# Patient Record
Sex: MALE
[De-identification: names, ages, dates, MRNs, and addresses within clinical notes are randomized per-mention and may not be internally consistent; named-entity substitution may affect disease eponyms.]

---

## 2019-03-04 ENCOUNTER — HOSPITAL ENCOUNTER (EMERGENCY)
Dept: HOSPITAL 31 - C.ER | Age: 64
LOS: 1 days | Discharge: HOME | End: 2019-03-05
Payer: SELF-PAY

## 2019-03-04 DIAGNOSIS — F43.23: Primary | ICD-10-CM

## 2019-03-04 LAB
ALBUMIN SERPL-MCNC: 4.7 G/DL (ref 3.5–5)
ALBUMIN/GLOB SERPL: 1.9 {RATIO} (ref 1–2.1)
ALT SERPL-CCNC: 40 U/L (ref 21–72)
AST SERPL-CCNC: 21 U/L (ref 17–59)
BASOPHILS # BLD AUTO: 0 K/UL (ref 0–0.2)
BASOPHILS NFR BLD: 0.3 % (ref 0–2)
BILIRUB UR-MCNC: NEGATIVE MG/DL
BUN SERPL-MCNC: 18 MG/DL (ref 9–20)
CALCIUM SERPL-MCNC: 9.1 MG/DL (ref 8.6–10.4)
COLOR UR: YELLOW
EOSINOPHIL # BLD AUTO: 0.1 K/UL (ref 0–0.7)
EOSINOPHIL NFR BLD: 2.4 % (ref 0–4)
ERYTHROCYTE [DISTWIDTH] IN BLOOD BY AUTOMATED COUNT: 13.5 % (ref 11.5–14.5)
GFR NON-AFRICAN AMERICAN: > 60
GLUCOSE UR STRIP-MCNC: NORMAL MG/DL
HGB BLD-MCNC: 15.1 G/DL (ref 12–18)
LEUKOCYTE ESTERASE UR-ACNC: (no result) LEU/UL
LYMPHOCYTES # BLD AUTO: 1.1 K/UL (ref 1–4.3)
LYMPHOCYTES NFR BLD AUTO: 20.9 % (ref 20–40)
MCH RBC QN AUTO: 28.4 PG (ref 27–31)
MCHC RBC AUTO-ENTMCNC: 33.2 G/DL (ref 33–37)
MCV RBC AUTO: 85.5 FL (ref 80–94)
MONOCYTES # BLD: 0.4 K/UL (ref 0–0.8)
MONOCYTES NFR BLD: 6.5 % (ref 0–10)
NEUTROPHILS # BLD: 3.8 K/UL (ref 1.8–7)
NEUTROPHILS NFR BLD AUTO: 69.9 % (ref 50–75)
NRBC BLD AUTO-RTO: 0.1 % (ref 0–2)
PH UR STRIP: 6 [PH] (ref 5–8)
PLATELET # BLD: 165 K/UL (ref 130–400)
PMV BLD AUTO: 9.5 FL (ref 7.2–11.7)
PROT UR STRIP-MCNC: NEGATIVE MG/DL
RBC # BLD AUTO: 5.34 MIL/UL (ref 4.4–5.9)
RBC # UR STRIP: NEGATIVE /UL
SP GR UR STRIP: 1.02 (ref 1–1.03)
UROBILINOGEN UR-MCNC: NORMAL MG/DL (ref 0.2–1)
WBC # BLD AUTO: 5.4 K/UL (ref 4.8–10.8)

## 2019-03-04 PROCEDURE — 85025 COMPLETE CBC W/AUTO DIFF WBC: CPT

## 2019-03-04 PROCEDURE — 81001 URINALYSIS AUTO W/SCOPE: CPT

## 2019-03-04 PROCEDURE — 99284 EMERGENCY DEPT VISIT MOD MDM: CPT

## 2019-03-04 PROCEDURE — 84100 ASSAY OF PHOSPHORUS: CPT

## 2019-03-04 PROCEDURE — 84443 ASSAY THYROID STIM HORMONE: CPT

## 2019-03-04 PROCEDURE — 82948 REAGENT STRIP/BLOOD GLUCOSE: CPT

## 2019-03-04 PROCEDURE — 93005 ELECTROCARDIOGRAM TRACING: CPT

## 2019-03-04 PROCEDURE — 80053 COMPREHEN METABOLIC PANEL: CPT

## 2019-03-04 PROCEDURE — 83735 ASSAY OF MAGNESIUM: CPT

## 2019-03-04 NOTE — C.PDOC
History Of Present Illness





63 y/o male with no known medical hx (has not seen a physician in many years) 

biba for feeling anxious and tense, with frequent urination and dry mouth. pt 

reports he has had these symptoms constantly for 2-3 months since [place] 

[place] from [place] after having a dispute with his wife. pt sts he was 

seen at another hospital 20 days ago ([facility]?)for same symptoms, given a pill 

to 'calm him' and referred to psychiatry, but he hasn't followed up. denies hi, 

si and ah.  denies abdominal pain, chest pain, sob, fever, chills, nausea, 

dysuria and vomiting.  no headaches. pt sts he just couldn't take the symptoms 

today, so pulled over his car ([employer] [employer]) and called ambulance. 


Time Seen by Provider: 19 19:06


Chief Complaint (Nursing): Psychiatric Evaluation


History/Exam Limitations: [id number]


Onset/Duration Of Symptoms: Days (2-3 months)


Current Symptoms Are (Timing): Worse


Suicide/Self Injury Attempted (Context): None


Modifying Factor(s): None


Severity: Moderate


Associated Symptoms: Anxiety.  denies: Suicidal Thoughts, Suicidal Plan





Past Medical History


Reviewed: Historical Data, Nursing Documentation, Vital Signs


Vital Signs: 





                                Last Vital Signs











Temp  98.8 F   19 18:49


 


Pulse  67   19 18:49


 


Resp  20   19 18:49


 


BP  162/62 H  19 18:49


 


Pulse Ox  99   19 18:49














- Medical History


PMH: No Chronic Diseases


Surgical History: No Surg Hx


Family History: States: Unknown Family Hx





- Social History


Hx Tobacco Use: No


Hx Alcohol Use: No


Hx Substance Use: No





- Immunization History


Hx Tetanus Toxoid Vaccination: Yes


Hx Influenza Vaccination: No


Hx Pneumococcal Vaccination: No





Review Of Systems


Constitutional: Negative for: Fever, Chills


ENT: Negative for: Throat Pain


Cardiovascular: Negative for: Chest Pain, Palpitations


Respiratory: Negative for: Cough, Shortness of Breath


Gastrointestinal: Negative for: Nausea, Vomiting, Abdominal Pain


Genitourinary: Positive for: Frequency.  Negative for: Dysuria


Skin: Negative for: Rash


Neurological: Negative for: Weakness, Numbness


Psych: Positive for: Anxiety





Physical Exam





- Physical Exam


Appears: Non-toxic, No Acute Distress, Other (anxious)


Skin: Normal Color, Warm, Dry


Head: Atraumatic, Normacephalic


Eye(s): bilateral: Normal Inspection


Oral Mucosa: Moist


Neck: Normal ROM, Supple


Chest: Symmetrical


Cardiovascular: Rhythm Regular


Respiratory: Normal Breath Sounds, No Rales, No Rhonchi, No Wheezing


Gastrointestinal/Abdominal: Soft, No Tenderness, No Guarding, No Rebound


Extremity: Normal ROM, No Tenderness, No Swelling


Neurological/Psych: Oriented x3, Normal Speech, Normal Cognition


Gait: Steady





ED Course And Treatment





- Laboratory Results


Result Diagrams: 


                                 19 19:49





                                 19 19:49


ECG: Interpreted By Me, Viewed By Me


ECG Rhythm: Sinus Bradycardia


ECG Interpretation: Normal


Interpretation Of ECG: sinus bradycardia, no st-t changes


Rate From EC


O2 Sat by Pulse Oximetry: 99


Pulse Ox Interpretation: Normal





Medical Decision Making


Medical Decision Making: 





pt wiht no known hx with 2-3 months anxiety, urinary frequency and dry mouth; 

eval for medical issues and get psych consult. . 





  Melissa from crisis made aware pt need for consult. pt to be given xanax 

while waiting. 





202 pt has been seen by Blaise from [facility]; pt to be discharged with one week 

of zoloft 50 mg as per Dr Arango's suggestion and will f/u in [facility] and 

psychiatric outpatient affliated with [facility] in [facility], near 

where he lives. i





Disposition


Counseled Patient/Family Regarding: Studies Performed, Diagnosis, Need For 

Followup, Rx Given





- Disposition


Disposition: HOME/ ROUTINE


Disposition Time: 02:04


Condition: GOOD


Additional Instructions: 





Velma Zoloft (sertralina) segn lo prescrito. Naif un seguimiento en la clnica 

de [facility] dirk paciente ambulatorio lo antes posible. Na[facility] el [facility] 

[facility] de [place] lo antes posible

[; llame para jason rosina el amy. Regrese a la gonzalez de emergencias para 

cualquier sntoma peor





Take Zoloft (sertraline) as prescribed. Follow up in [facility] clinic as a walk 

in patient as soon as possible. Follow up at the outpatient psychiatric center 

in [place] as soon as possible[; call for an appointment on Tues. Return to ER

 for any worse symptoms. 


Prescriptions: 


Sertraline [Zoloft] 50 mg PO DAILY #7 tab


Instructions:  Anxiety, Adult (DC)


Forms:  Gen Discharge Inst Portuguese, [facility] (Portuguese)


Print Language: Malagasy





- Clinical Impression


Clinical Impression: 


 Adjustment disorder with mixed anxiety and depressed mood

## 2019-03-05 VITALS — RESPIRATION RATE: 16 BRPM

## 2019-03-05 VITALS — DIASTOLIC BLOOD PRESSURE: 91 MMHG | HEART RATE: 86 BPM | TEMPERATURE: 98.3 F | SYSTOLIC BLOOD PRESSURE: 151 MMHG

## 2019-03-05 VITALS — OXYGEN SATURATION: 99 %

## 2019-03-05 NOTE — CARD
--------------- APPROVED REPORT --------------





Date of service: 03/04/2019



EKG Measurement

Heart [id number]

[id number]

[id number]

[id number]

[id number]



<Conclusion>

Sinus bradycardia

Otherwise normal ECG